# Patient Record
Sex: MALE | Race: OTHER | NOT HISPANIC OR LATINO | URBAN - METROPOLITAN AREA
[De-identification: names, ages, dates, MRNs, and addresses within clinical notes are randomized per-mention and may not be internally consistent; named-entity substitution may affect disease eponyms.]

---

## 2023-05-18 ENCOUNTER — EMERGENCY (EMERGENCY)
Facility: HOSPITAL | Age: 28
LOS: 1 days | Discharge: HOME | End: 2023-05-18
Attending: EMERGENCY MEDICINE | Admitting: EMERGENCY MEDICINE
Payer: COMMERCIAL

## 2023-05-18 VITALS
DIASTOLIC BLOOD PRESSURE: 88 MMHG | HEART RATE: 89 BPM | RESPIRATION RATE: 16 BRPM | OXYGEN SATURATION: 97 % | SYSTOLIC BLOOD PRESSURE: 146 MMHG | TEMPERATURE: 98 F

## 2023-05-18 PROCEDURE — 99283 EMERGENCY DEPT VISIT LOW MDM: CPT

## 2023-05-18 NOTE — ED ADULT NURSE NOTE - OBJECTIVE STATEMENT
Patient A&o X4, received in wellness, with complaints of seasonal allergies. Patient states, "I've never experienced allergies this bad before". Patient reports that since Friday he has been experiencing nasal congestion, itchy and red eyes. Patient able to speak in clear sentences, respirations equal and unlabored. Lung sounds clear b/l, equal chest rise and fall noted. Denies CP/SOB, fever, chills, nausea, vomiting and diarrhea at this time. Skin warm and dry. Provider at bedside for eval, pending further orders.

## 2023-05-18 NOTE — ED ADULT NURSE NOTE - NSFALLUNIVINTERV_ED_ALL_ED
Bed/Stretcher in lowest position, wheels locked, appropriate side rails in place/Call bell, personal items and telephone in reach/Instruct patient to call for assistance before getting out of bed/chair/stretcher/Non-slip footwear applied when patient is off stretcher/[place] to call system/Physically safe environment - no spills, clutter or unnecessary equipment/Purposeful proactive rounding/Room/bathroom lighting operational, light cord in reach

## 2023-05-18 NOTE — ED PROVIDER NOTE - OBJECTIVE STATEMENT
Attending/Sanjiv: 29 yo M, no sig PMhx, p/w ~ five to six days of pruritic, "runny" eyes, nasal congestions. Denies fever/chills, sore throat, chest pain, SOB. Pt has taken Claritin-D with temporary improvement.

## 2023-05-18 NOTE — ED PROVIDER NOTE - PHYSICAL EXAMINATION
Attending/Sanjiv: Well-appearing, NAD; PERRL/EOMI, non-icterus, +conjunctiva erytmea, no d/c; _nasal mucosa-slightly swollen, erythematous no sinus d/c; supple, no LIVIA, no JVD, RRR, CTAB; Abd-soft, NT/ND, no HSM; no LE edema, A&Ox3, nonfocal; Skin-warm/dry

## 2023-05-18 NOTE — ED ADULT TRIAGE NOTE - CHIEF COMPLAINT QUOTE
pt c/o seasonal allergies, nasal congestion and itchy/watery eyes since weekend, no past medical history

## 2023-05-18 NOTE — ED PROVIDER NOTE - PATIENT PORTAL LINK FT
You can access the FollowMyHealth Patient Portal offered by [facility] by registering at the following website: [email or web address]. By joining [facility]’s FollowMyHealth portal, you will also be able to view your health information using other applications (apps) compatible with our system.

## 2023-05-18 NOTE — ED PROVIDER NOTE - CLINICAL SUMMARY MEDICAL DECISION MAKING FREE TEXT BOX
A/P 27 yo M p/w URI symptoms, afebrile; exam noted for injected conjunctiva and nasal mucosa swelling; most likely seasonal allergies  Plan: Nasal decongestant/steroids, non-sedating antihistamine

## 2023-05-18 NOTE — ED PROVIDER NOTE - NSFOLLOWUPINSTRUCTIONS_ED_ALL_ED_FT
Antihistamine: Zyrtec 10mg oral once a day  Nasal steroid: one spray each nostril twice a day  Nasal decongestant: two sprays each nostril twice a day for three days    If you develop worsening symptoms including fever, difficulty breathing return to the emergency department

## 2024-05-13 NOTE — ED ADULT NURSE NOTE - NSICDXFAMILYHX_GEN_ALL_CORE_FT
Pt ambulatory to triage c/o N/V/D, chills, and reflux since Friday.     Hx GERD  
FAMILY HISTORY:  No pertinent family history in first degree relatives